# Patient Record
Sex: FEMALE | Employment: FULL TIME | URBAN - METROPOLITAN AREA
[De-identification: names, ages, dates, MRNs, and addresses within clinical notes are randomized per-mention and may not be internally consistent; named-entity substitution may affect disease eponyms.]

---

## 2018-10-26 ENCOUNTER — TRANSFERRED RECORDS (OUTPATIENT)
Dept: HEALTH INFORMATION MANAGEMENT | Facility: CLINIC | Age: 63
End: 2018-10-26

## 2020-12-08 ENCOUNTER — TELEPHONE (OUTPATIENT)
Dept: ORTHOPEDICS | Facility: CLINIC | Age: 65
End: 2020-12-08

## 2020-12-08 NOTE — TELEPHONE ENCOUNTER
[facility] Call Center    Phone Message    May a detailed message be left on voicemail: yes     Reason for Call: Other: This pt was calling to check on the status of a referral order that was sent to Dr. Freeman at the [facility]. This pt did NOT previously have a chart in the system, so I created a new chart with a phone number. (The pt was unsure if the referral order had a number to reach back out to her on.) The pt was following up to see if Dr. Freeman has received those orders. Please have someone on Dr. Freeman's care team reach back out to this pt regarding this referral.     Action Taken: Message routed to:  [facility] ([facility]): Ortho    Travel Screening: Not Applicable

## 2020-12-08 NOTE — TELEPHONE ENCOUNTER
Called pt and LVM offering next available new apt on 2/3/2020 or virtually on 1/20. If she calls back please schedule.

## 2020-12-09 NOTE — TELEPHONE ENCOUNTER
RECORDS RECEIVED FROM: (B) Knees / Xray done at  on 07/07/2020 / Dr. Hubert Valderrama @  / [employer]  //referral exists no visit limits thru  12/31/20(MSG)   DATE RECEIVED: Feb 3, 2021     NOTES STATUS DETAILS   OFFICE NOTE from referring provider Care Everywhere    OFFICE NOTE from other specialist N/A    DISCHARGE SUMMARY from hospital N/A    DISCHARGE REPORT from the ER N/A    OPERATIVE REPORT N/A    MEDICATION LIST Care Everywhere    IMPLANT RECORD/STICKER N/A    LABS     CBC/DIFF N/A    CULTURES N/A    INJECTIONS DONE IN RADIOLOGY N/A    MRI N/A    CT SCAN N/A    XRAYS (IMAGES & REPORTS) In process    TUMOR     PATHOLOGY  Slides & report N/A    01/28/21   11:22 AM   Resolved images in PACS  Katerine Graham CMA    12/09/20   9:28 AM   Called  and asked for knee images to be pushed  Katerine Graham CMA

## 2021-01-13 DIAGNOSIS — M25.562 BILATERAL KNEE PAIN: Primary | ICD-10-CM

## 2021-01-13 DIAGNOSIS — M25.561 BILATERAL KNEE PAIN: Primary | ICD-10-CM

## 2021-02-03 ENCOUNTER — ANCILLARY PROCEDURE (OUTPATIENT)
Dept: GENERAL RADIOLOGY | Facility: CLINIC | Age: 66
End: 2021-02-03
Attending: ORTHOPAEDIC SURGERY
Payer: COMMERCIAL

## 2021-02-03 ENCOUNTER — PRE VISIT (OUTPATIENT)
Dept: ORTHOPEDICS | Facility: CLINIC | Age: 66
End: 2021-02-03

## 2021-02-03 ENCOUNTER — OFFICE VISIT (OUTPATIENT)
Dept: ORTHOPEDICS | Facility: CLINIC | Age: 66
End: 2021-02-03
Payer: COMMERCIAL

## 2021-02-03 VITALS — WEIGHT: 293 LBS | HEIGHT: 66 IN | BODY MASS INDEX: 47.09 KG/M2

## 2021-02-03 DIAGNOSIS — G89.29 CHRONIC PAIN OF BOTH KNEES: Primary | ICD-10-CM

## 2021-02-03 DIAGNOSIS — M25.562 BILATERAL KNEE PAIN: ICD-10-CM

## 2021-02-03 DIAGNOSIS — E66.01 MORBID OBESITY (H): ICD-10-CM

## 2021-02-03 DIAGNOSIS — M25.561 BILATERAL KNEE PAIN: ICD-10-CM

## 2021-02-03 DIAGNOSIS — M25.561 CHRONIC PAIN OF BOTH KNEES: Primary | ICD-10-CM

## 2021-02-03 DIAGNOSIS — M25.562 CHRONIC PAIN OF BOTH KNEES: Primary | ICD-10-CM

## 2021-02-03 PROCEDURE — 73562 X-RAY EXAM OF KNEE 3: CPT | Mod: LT | Performed by: RADIOLOGY

## 2021-02-03 PROCEDURE — 99203 OFFICE O/P NEW LOW 30 MIN: CPT | Performed by: ORTHOPAEDIC SURGERY

## 2021-02-03 PROCEDURE — 77073 BONE LENGTH STUDIES: CPT | Performed by: RADIOLOGY

## 2021-02-03 RX ORDER — OMEGA-3 FATTY ACIDS/FISH OIL 300-1000MG
200 CAPSULE ORAL EVERY 4 HOURS PRN
COMMUNITY

## 2021-02-03 RX ORDER — ASCORBIC ACID 100 MG
TABLET,CHEWABLE ORAL
COMMUNITY

## 2021-02-03 RX ORDER — OMEGA-3/DHA/EPA/FISH OIL 60 MG-90MG
CAPSULE ORAL
COMMUNITY

## 2021-02-03 RX ORDER — TRAZODONE HYDROCHLORIDE 100 MG/1
200 TABLET ORAL
COMMUNITY
Start: 2020-07-07

## 2021-02-03 RX ORDER — CALCIUM CARBONATE 500(1250)
TABLET,CHEWABLE ORAL
COMMUNITY

## 2021-02-03 ASSESSMENT — MIFFLIN-ST. JEOR: SCORE: 1918.47

## 2021-02-03 NOTE — LETTER
2/3/2021         RE: Dorita Sparks  [address] [address]        Dear Colleague,    Thank you for referring your patient, Dorita Sparks, to the [facility] [place]. Please see a copy of my visit note below.    [facility] Phys[facility], Orthopaedic Surgery, Arthritis, Hip and Knee Replacement    Dorita Sparks MRN# [id number]   Age: 65 year old YOB: 1955     Requesting physician: Hubert Valderrama              History of Present Illness:   Dorita Sparks is a 65 year old year old female who presents today for evaluation and management of bilateral right > left knee pain.    Dorita is a very pleasant 65-year-old woman with a longstanding history of right greater than left knee pain.  She notes that the pain has been present for roughly 5 years or so.  The when the pain initially got severe she was using a cane to help with ambulation.  However, at this point she is not requiring any assistive devices.  To deal with the pain she has modified her activities over the last couple years.  She feels that the swimming and biking she had been doing has been quite helpful.  Unfortunately because of Covid she has not been able to get in the gym and use the recumbent bike or the pool.  She enjoys swimming outside in the summer.  She also takes anti-inflammatory medications.  She has had injections in the past and notes that the injections help with the pain but only for very short amount of time, couple days or so.  For the most part she is able to deal with the symptoms reasonably well and continues to do many of the things she enjoys doing but is becoming more more difficult to do so.  She localizes the pain diffusely throughout the knee.  She does not have any hip or lower back pain aside from some occasional lateral bilateral hip pain.           Past Medical History:   There is no problem list on file for this patient.    No past medical history on  file.  Prior history of blood clot: none  Prior history of bleeding problems: none  Prior history of anesthetic complications: none  Currently on opioids:  none  History of Diabetes (if so, recent A1c): no           Past Surgical History:   No past surgical history on file.         Social History:     Social History     Socioeconomic History     Marital status: Single     Spouse name: Not on file     Number of children: Not on file     Years of education: Not on file     Highest education level: Not on file   Occupational History     Not on file   Social Needs     Financial resource strain: Not on file     Food insecurity     Worry: Not on file     Inability: Not on file     Transportation needs     Medical: Not on file     Non-medical: Not on file   Tobacco Use     Smoking status: Not on file   Substance and Sexual Activity     Alcohol use: Not on file     Drug use: Not on file     Sexual activity: Not on file   Lifestyle     Physical activity     Days per week: Not on file     Minutes per session: Not on file     Stress: Not on file   Relationships     Social connections     Talks on phone: Not on file     Gets together: Not on file     Attends Jain service: Not on file     Active member of club or organization: Not on file     Attends meetings of clubs or organizations: Not on file     Relationship status: Not on file     Intimate partner violence     Fear of current or ex partner: Not on file     Emotionally abused: Not on file     Physically abused: Not on file     Forced sexual activity: Not on file   Other Topics Concern     Not on file   Social History Narrative     Not on file       Smoking: non smoker           Family History:     No family history on file.           Medications:     Current Outpatient Medications   Medication Sig     ferrous sulfate 140 (45 Fe) MG TBCR CR tablet Take 140 mg by mouth daily     ibuprofen (ADVIL/MOTRIN) 200 MG capsule Take 200 mg by mouth every 4 hours as needed for  "fever     NEW MED Tumeric     Omega-3 Fatty Acids (FISH OIL) 500 MG CAPS      omeprazole (PRILOSEC) 20 MG DR capsule TAKE ONE CAPSULE BY MOUTH EVERY DAY ONE HOUR BEFORE A MEAL.     traZODone (DESYREL) 100 MG tablet Take 200 mg by mouth     Ascorbic Acid (VITAMIN C) 100 MG CHEW      B Complex Vitamins (VITAMIN B COMPLEX 100 IJ)      calcium carbonate 1250 (500 Ca) MG CHEW      MULTIPLE VITAMIN PO Take 1 tablet by mouth     No current facility-administered medications for this visit.             Review of Systems:   A comprehensive 10 point review of systems (constitutional, ENT, cardiac, peripheral vascular, lymphatic, respiratory, GI, , Musculoskeletal, skin, Neurological) was performed and found to be negative except as described in this note.     Also see intake form completed by patient.             Physical Exam:     EXAMINATION pertinent findings:   VITAL SIGNS: Height 1.676 m (5' 6\"), weight 135.2 kg (298 lb).  Body mass index is 48.1 kg/m .  GEN: AOx3, cooperative, no distress  RESP: non labored breathing   ABD: benign   SKIN: grossly normal   LYMPHATIC: grossly normal   NEURO: grossly normal   VASCULAR: satisfactory perfusion of all extremities  MUSCULOSKELETAL:   She walks with an antalgic gait.  Examination of the right knee demonstrates varus alignment that is passively correctable to neutral.  She has mild medial lateral joint line tenderness palpation.  She has no pain with hip range of motion.  Extensor mechanism is intact.  Range of motion is roughly from 0 to 120 degrees.  Left knee range of motion is from 0-1 20.  The knee is stable to varus and valgus stress.  She has mild knee tenderness palpation.  She has painless knee range of motion.  No pain with hip range of motion.  Extensor mechanism is intact.  Bilateral ankle plantar flexion dorsiflexion are intact.  Intact sensation throughout her feet.  2+ DP pulses.             Data:   Imaging:   X-rays demonstrate end-stage bone-on-bone bilateral " knee arthritis.           Assessment and Plan:   Assessment:  Dorita is a very pleasant 65-year-old woman with bilateral right greater than left knee osteoarthritis.  We discussed the natural history of her arthritis as well as ongoing management options.  Reviewed surgical nonsurgical treatments.  We discussed knee replacement in detail.  We discussed that ultimately this would be a reasonable option to consider.  She notes that she deals quite well with her symptoms.  We discussed that continuing with the physical therapy, strengthening stretching biking and swimming would be a good option to help alleviate the symptoms.  We discussed that the risks of joint placement would be slightly higher given her elevated BMI.  She would like to work on the therapy and weight management.  She will let us know if the symptoms are worsening or if she was interested in considering surgery further.  She will otherwise let us know if she has any questions or concerns following today's visit.      Gonzales Freeman M.D.     Arthritis and Joint Replacement  Department of Orthopaedic Surgery, [facility]  [email or web address]  [phone] (pager)

## 2021-02-03 NOTE — PROGRESS NOTES
[facility] Physicians, Orthopaedic Surgery, Arthritis, Hip and Knee Replacement    Dorita Sparks MRN# [id number]   Age: 65 year old YOB: 1955     Requesting physician: Hubert Valderrama              History of Present Illness:   Dorita Sparks is a 65 year old year old female who presents today for evaluation and management of bilateral right > left knee pain.    Dorita is a very pleasant 65-year-old woman with a longstanding history of right greater than left knee pain.  She notes that the pain has been present for roughly 5 years or so.  The when the pain initially got severe she was using a cane to help with ambulation.  However, at this point she is not requiring any assistive devices.  To deal with the pain she has modified her activities over the last couple years.  She feels that the swimming and biking she had been doing has been quite helpful.  Unfortunately because of Covid she has not been able to get in the gym and use the recumbent bike or the pool.  She enjoys swimming outside in the summer.  She also takes anti-inflammatory medications.  She has had injections in the past and notes that the injections help with the pain but only for very short amount of time, couple days or so.  For the most part she is able to deal with the symptoms reasonably well and continues to do many of the things she enjoys doing but is becoming more more difficult to do so.  She localizes the pain diffusely throughout the knee.  She does not have any hip or lower back pain aside from some occasional lateral bilateral hip pain.           Past Medical History:   There is no problem list on file for this patient.    No past medical history on file.  Prior history of blood clot: none  Prior history of bleeding problems: none  Prior history of anesthetic complications: none  Currently on opioids:  none  History of Diabetes (if so, recent A1c): no           Past Surgical History:   No past surgical history on file.          Social History:     Social History     Socioeconomic History     Marital status: Single     Spouse name: Not on file     Number of children: Not on file     Years of education: Not on file     Highest education level: Not on file   Occupational History     Not on file   Social Needs     Financial resource strain: Not on file     Food insecurity     Worry: Not on file     Inability: Not on file     Transportation needs     Medical: Not on file     Non-medical: Not on file   Tobacco Use     Smoking status: Not on file   Substance and Sexual Activity     Alcohol use: Not on file     Drug use: Not on file     Sexual activity: Not on file   Lifestyle     Physical activity     Days per week: Not on file     Minutes per session: Not on file     Stress: Not on file   Relationships     Social connections     Talks on phone: Not on file     Gets together: Not on file     Attends Muslim service: Not on file     Active member of club or organization: Not on file     Attends meetings of clubs or organizations: Not on file     Relationship status: Not on file     Intimate partner violence     Fear of current or ex partner: Not on file     Emotionally abused: Not on file     Physically abused: Not on file     Forced sexual activity: Not on file   Other Topics Concern     Not on file   Social History Narrative     Not on file       Smoking: non smoker           Family History:     No family history on file.           Medications:     Current Outpatient Medications   Medication Sig     ferrous sulfate 140 (45 Fe) MG TBCR CR tablet Take 140 mg by mouth daily     ibuprofen (ADVIL/MOTRIN) 200 MG capsule Take 200 mg by mouth every 4 hours as needed for fever     NEW MED Tumeric     Omega-3 Fatty Acids (FISH OIL) 500 MG CAPS      omeprazole (PRILOSEC) 20 MG DR capsule TAKE ONE CAPSULE BY MOUTH EVERY DAY ONE HOUR BEFORE A MEAL.     traZODone (DESYREL) 100 MG tablet Take 200 mg by mouth     Ascorbic Acid (VITAMIN C) 100 MG CHEW      B  "Complex Vitamins (VITAMIN B COMPLEX 100 IJ)      calcium carbonate 1250 (500 Ca) MG CHEW      MULTIPLE VITAMIN PO Take 1 tablet by mouth     No current facility-administered medications for this visit.             Review of Systems:   A comprehensive 10 point review of systems (constitutional, ENT, cardiac, peripheral vascular, lymphatic, respiratory, GI, , Musculoskeletal, skin, Neurological) was performed and found to be negative except as described in this note.     Also see intake form completed by patient.             Physical Exam:     EXAMINATION pertinent findings:   VITAL SIGNS: Height 1.676 m (5' 6\"), weight 135.2 kg (298 lb).  Body mass index is 48.1 kg/m .  GEN: AOx3, cooperative, no distress  RESP: non labored breathing   ABD: benign   SKIN: grossly normal   LYMPHATIC: grossly normal   NEURO: grossly normal   VASCULAR: satisfactory perfusion of all extremities  MUSCULOSKELETAL:   She walks with an antalgic gait.  Examination of the right knee demonstrates varus alignment that is passively correctable to neutral.  She has mild medial lateral joint line tenderness palpation.  She has no pain with hip range of motion.  Extensor mechanism is intact.  Range of motion is roughly from 0 to 120 degrees.  Left knee range of motion is from 0-1 20.  The knee is stable to varus and valgus stress.  She has mild knee tenderness palpation.  She has painless knee range of motion.  No pain with hip range of motion.  Extensor mechanism is intact.  Bilateral ankle plantar flexion dorsiflexion are intact.  Intact sensation throughout her feet.  2+ DP pulses.             Data:   Imaging:   X-rays demonstrate end-stage bone-on-bone bilateral knee arthritis.           Assessment and Plan:   Assessment:  Dorita is a very pleasant 65-year-old woman with bilateral right greater than left knee osteoarthritis.  We discussed the natural history of her arthritis as well as ongoing management options.  Reviewed surgical nonsurgical " treatments.  We discussed knee replacement in detail.  We discussed that ultimately this would be a reasonable option to consider.  She notes that she deals quite well with her symptoms.  We discussed that continuing with the physical therapy, strengthening stretching biking and swimming would be a good option to help alleviate the symptoms.  We discussed that the risks of joint placement would be slightly higher given her elevated BMI.  She would like to work on the therapy and weight management.  She will let us know if the symptoms are worsening or if she was interested in considering surgery further.  She will otherwise let us know if she has any questions or concerns following today's visit.      Gonzales Freeman M.D.     Arthritis and Joint Replacement  Department of Orthopaedic Surgery, [facility]  [email or web address]  [phone] (pager)           Review of Systems:

## 2021-08-15 ENCOUNTER — HEALTH MAINTENANCE LETTER (OUTPATIENT)
Age: 66
End: 2021-08-15

## 2021-10-11 ENCOUNTER — HEALTH MAINTENANCE LETTER (OUTPATIENT)
Age: 66
End: 2021-10-11

## 2021-12-05 ENCOUNTER — HEALTH MAINTENANCE LETTER (OUTPATIENT)
Age: 66
End: 2021-12-05

## 2022-09-24 ENCOUNTER — HEALTH MAINTENANCE LETTER (OUTPATIENT)
Age: 67
End: 2022-09-24

## 2023-01-30 ENCOUNTER — HEALTH MAINTENANCE LETTER (OUTPATIENT)
Age: 68
End: 2023-01-30

## 2023-05-16 ENCOUNTER — LAB REQUISITION (OUTPATIENT)
Dept: LAB | Facility: CLINIC | Age: 68
End: 2023-05-16

## 2023-05-16 DIAGNOSIS — I10 ESSENTIAL (PRIMARY) HYPERTENSION: ICD-10-CM

## 2023-05-16 DIAGNOSIS — D64.9 ANEMIA, UNSPECIFIED: ICD-10-CM

## 2023-05-17 LAB
ANION GAP SERPL CALCULATED.3IONS-SCNC: 13 MMOL/L (ref 7–15)
BUN SERPL-MCNC: 10.5 MG/DL (ref 8–23)
CALCIUM SERPL-MCNC: 9.3 MG/DL (ref 8.8–10.2)
CHLORIDE SERPL-SCNC: 105 MMOL/L (ref 98–107)
CREAT SERPL-MCNC: 0.6 MG/DL (ref 0.51–0.95)
DEPRECATED HCO3 PLAS-SCNC: 24 MMOL/L (ref 22–29)
GFR SERPL CREATININE-BSD FRML MDRD: >90 ML/MIN/1.73M2
GLUCOSE SERPL-MCNC: 108 MG/DL (ref 70–99)
HGB BLD-MCNC: 11.2 G/DL (ref 11.7–15.7)
POTASSIUM SERPL-SCNC: 4.1 MMOL/L (ref 3.4–5.3)
SODIUM SERPL-SCNC: 142 MMOL/L (ref 136–145)

## 2023-05-17 PROCEDURE — 82310 ASSAY OF CALCIUM: CPT | Performed by: FAMILY MEDICINE

## 2023-05-17 PROCEDURE — 85018 HEMOGLOBIN: CPT | Performed by: FAMILY MEDICINE

## 2023-05-17 PROCEDURE — P9604 ONE-WAY ALLOW PRORATED TRIP: HCPCS | Performed by: FAMILY MEDICINE

## 2023-05-17 PROCEDURE — 36415 COLL VENOUS BLD VENIPUNCTURE: CPT | Performed by: FAMILY MEDICINE

## 2023-05-18 ENCOUNTER — LAB REQUISITION (OUTPATIENT)
Dept: LAB | Facility: CLINIC | Age: 68
End: 2023-05-18

## 2023-05-19 LAB
ANION GAP SERPL CALCULATED.3IONS-SCNC: 9 MMOL/L (ref 7–15)
BASOPHILS # BLD AUTO: 0.1 10E3/UL (ref 0–0.2)
BASOPHILS NFR BLD AUTO: 1 %
BUN SERPL-MCNC: 11.8 MG/DL (ref 8–23)
CALCIUM SERPL-MCNC: 9.2 MG/DL (ref 8.8–10.2)
CHLORIDE SERPL-SCNC: 104 MMOL/L (ref 98–107)
CREAT SERPL-MCNC: 0.65 MG/DL (ref 0.51–0.95)
DEPRECATED HCO3 PLAS-SCNC: 27 MMOL/L (ref 22–29)
EOSINOPHIL # BLD AUTO: 0.3 10E3/UL (ref 0–0.7)
EOSINOPHIL NFR BLD AUTO: 4 %
ERYTHROCYTE [DISTWIDTH] IN BLOOD BY AUTOMATED COUNT: 13.1 % (ref 10–15)
GFR SERPL CREATININE-BSD FRML MDRD: >90 ML/MIN/1.73M2
GLUCOSE SERPL-MCNC: 101 MG/DL (ref 70–99)
HCT VFR BLD AUTO: 34 % (ref 35–47)
HGB BLD-MCNC: 10.6 G/DL (ref 11.7–15.7)
IMM GRANULOCYTES # BLD: 0 10E3/UL
IMM GRANULOCYTES NFR BLD: 1 %
LYMPHOCYTES # BLD AUTO: 0.7 10E3/UL (ref 0.8–5.3)
LYMPHOCYTES NFR BLD AUTO: 12 %
MCH RBC QN AUTO: 30.5 PG (ref 26.5–33)
MCHC RBC AUTO-ENTMCNC: 31.2 G/DL (ref 31.5–36.5)
MCV RBC AUTO: 98 FL (ref 78–100)
MONOCYTES # BLD AUTO: 0.5 10E3/UL (ref 0–1.3)
MONOCYTES NFR BLD AUTO: 8 %
NEUTROPHILS # BLD AUTO: 4.5 10E3/UL (ref 1.6–8.3)
NEUTROPHILS NFR BLD AUTO: 74 %
NRBC # BLD AUTO: 0 10E3/UL
NRBC BLD AUTO-RTO: 0 /100
PLATELET # BLD AUTO: 490 10E3/UL (ref 150–450)
POTASSIUM SERPL-SCNC: 4.1 MMOL/L (ref 3.4–5.3)
RBC # BLD AUTO: 3.48 10E6/UL (ref 3.8–5.2)
SODIUM SERPL-SCNC: 140 MMOL/L (ref 136–145)
WBC # BLD AUTO: 6.1 10E3/UL (ref 4–11)

## 2023-05-19 PROCEDURE — 36415 COLL VENOUS BLD VENIPUNCTURE: CPT | Performed by: FAMILY MEDICINE

## 2023-05-19 PROCEDURE — 82310 ASSAY OF CALCIUM: CPT | Performed by: FAMILY MEDICINE

## 2023-05-19 PROCEDURE — 85004 AUTOMATED DIFF WBC COUNT: CPT | Performed by: FAMILY MEDICINE

## 2023-05-19 PROCEDURE — P9604 ONE-WAY ALLOW PRORATED TRIP: HCPCS | Performed by: FAMILY MEDICINE

## 2023-05-25 ENCOUNTER — LAB REQUISITION (OUTPATIENT)
Dept: LAB | Facility: CLINIC | Age: 68
End: 2023-05-25

## 2023-05-25 DIAGNOSIS — D64.9 ANEMIA, UNSPECIFIED: ICD-10-CM

## 2023-05-25 DIAGNOSIS — I10 ESSENTIAL (PRIMARY) HYPERTENSION: ICD-10-CM

## 2023-05-26 LAB
ANION GAP SERPL CALCULATED.3IONS-SCNC: 13 MMOL/L (ref 7–15)
BASOPHILS # BLD AUTO: 0.1 10E3/UL (ref 0–0.2)
BASOPHILS NFR BLD AUTO: 2 %
BUN SERPL-MCNC: 10.9 MG/DL (ref 8–23)
CALCIUM SERPL-MCNC: 9.7 MG/DL (ref 8.8–10.2)
CHLORIDE SERPL-SCNC: 102 MMOL/L (ref 98–107)
CREAT SERPL-MCNC: 0.6 MG/DL (ref 0.51–0.95)
DEPRECATED HCO3 PLAS-SCNC: 24 MMOL/L (ref 22–29)
EOSINOPHIL # BLD AUTO: 0.3 10E3/UL (ref 0–0.7)
EOSINOPHIL NFR BLD AUTO: 5 %
ERYTHROCYTE [DISTWIDTH] IN BLOOD BY AUTOMATED COUNT: 13.2 % (ref 10–15)
GFR SERPL CREATININE-BSD FRML MDRD: >90 ML/MIN/1.73M2
GLUCOSE SERPL-MCNC: 114 MG/DL (ref 70–99)
HCT VFR BLD AUTO: 39.1 % (ref 35–47)
HGB BLD-MCNC: 12 G/DL (ref 11.7–15.7)
IMM GRANULOCYTES # BLD: 0 10E3/UL
IMM GRANULOCYTES NFR BLD: 1 %
LYMPHOCYTES # BLD AUTO: 0.7 10E3/UL (ref 0.8–5.3)
LYMPHOCYTES NFR BLD AUTO: 12 %
MCH RBC QN AUTO: 30.2 PG (ref 26.5–33)
MCHC RBC AUTO-ENTMCNC: 30.7 G/DL (ref 31.5–36.5)
MCV RBC AUTO: 99 FL (ref 78–100)
MONOCYTES # BLD AUTO: 0.4 10E3/UL (ref 0–1.3)
MONOCYTES NFR BLD AUTO: 8 %
NEUTROPHILS # BLD AUTO: 4.3 10E3/UL (ref 1.6–8.3)
NEUTROPHILS NFR BLD AUTO: 72 %
NRBC # BLD AUTO: 0 10E3/UL
NRBC BLD AUTO-RTO: 0 /100
PLATELET # BLD AUTO: 477 10E3/UL (ref 150–450)
POTASSIUM SERPL-SCNC: 4 MMOL/L (ref 3.4–5.3)
RBC # BLD AUTO: 3.97 10E6/UL (ref 3.8–5.2)
SODIUM SERPL-SCNC: 139 MMOL/L (ref 136–145)
WBC # BLD AUTO: 5.8 10E3/UL (ref 4–11)

## 2023-05-26 PROCEDURE — 85025 COMPLETE CBC W/AUTO DIFF WBC: CPT | Performed by: FAMILY MEDICINE

## 2023-05-26 PROCEDURE — 36415 COLL VENOUS BLD VENIPUNCTURE: CPT | Performed by: FAMILY MEDICINE

## 2023-05-26 PROCEDURE — P9604 ONE-WAY ALLOW PRORATED TRIP: HCPCS | Performed by: FAMILY MEDICINE

## 2023-05-26 PROCEDURE — 80048 BASIC METABOLIC PNL TOTAL CA: CPT | Performed by: FAMILY MEDICINE

## 2023-06-01 ENCOUNTER — LAB REQUISITION (OUTPATIENT)
Dept: LAB | Facility: CLINIC | Age: 68
End: 2023-06-01

## 2023-06-01 DIAGNOSIS — R20.2 PARESTHESIA OF SKIN: ICD-10-CM

## 2023-06-02 LAB
TSH SERPL DL<=0.005 MIU/L-ACNC: 1.89 UIU/ML (ref 0.3–4.2)
VIT B12 SERPL-MCNC: 789 PG/ML (ref 232–1245)

## 2023-06-02 PROCEDURE — 84443 ASSAY THYROID STIM HORMONE: CPT | Performed by: NURSE PRACTITIONER

## 2023-06-02 PROCEDURE — 36415 COLL VENOUS BLD VENIPUNCTURE: CPT | Performed by: NURSE PRACTITIONER

## 2023-06-02 PROCEDURE — 82607 VITAMIN B-12: CPT | Performed by: NURSE PRACTITIONER

## 2023-06-02 PROCEDURE — P9604 ONE-WAY ALLOW PRORATED TRIP: HCPCS | Performed by: NURSE PRACTITIONER

## 2024-03-02 ENCOUNTER — HEALTH MAINTENANCE LETTER (OUTPATIENT)
Age: 69
End: 2024-03-02